# Patient Record
Sex: MALE | Race: WHITE | NOT HISPANIC OR LATINO | URBAN - NONMETROPOLITAN AREA
[De-identification: names, ages, dates, MRNs, and addresses within clinical notes are randomized per-mention and may not be internally consistent; named-entity substitution may affect disease eponyms.]

---

## 2019-03-05 ENCOUNTER — IMPORTED ENCOUNTER (OUTPATIENT)
Dept: URBAN - NONMETROPOLITAN AREA CLINIC 1 | Facility: CLINIC | Age: 59
End: 2019-03-05

## 2019-03-05 PROBLEM — H52.4: Noted: 2019-03-05

## 2019-03-05 PROBLEM — H52.13: Noted: 2019-03-05

## 2019-03-05 PROCEDURE — 92004 COMPRE OPH EXAM NEW PT 1/>: CPT

## 2019-03-05 PROCEDURE — 92015 DETERMINE REFRACTIVE STATE: CPT

## 2019-03-05 NOTE — PATIENT DISCUSSION
Myopia-Discussed diagnosis with patient. -Explained that people who are myopic are at a higher risk for developing RD/RT and reviewed associated S&S.-Pt to contact our office if symptoms develop. Presbyopia-Discussed diagnosis with patient. Updated spec Rx given. Recommend lens that will provide comfort as well as protect safety and health of eyes.

## 2020-03-10 ENCOUNTER — IMPORTED ENCOUNTER (OUTPATIENT)
Dept: URBAN - NONMETROPOLITAN AREA CLINIC 1 | Facility: CLINIC | Age: 60
End: 2020-03-10

## 2020-03-10 PROCEDURE — 92014 COMPRE OPH EXAM EST PT 1/>: CPT

## 2020-03-10 PROCEDURE — 92015 DETERMINE REFRACTIVE STATE: CPT

## 2021-03-16 ENCOUNTER — IMPORTED ENCOUNTER (OUTPATIENT)
Dept: URBAN - NONMETROPOLITAN AREA CLINIC 1 | Facility: CLINIC | Age: 61
End: 2021-03-16

## 2021-03-16 ENCOUNTER — PREPPED CHART (OUTPATIENT)
Dept: RURAL CLINIC 1 | Facility: CLINIC | Age: 61
End: 2021-03-16

## 2021-03-16 PROCEDURE — 92014 COMPRE OPH EXAM EST PT 1/>: CPT

## 2021-03-16 PROCEDURE — 92015 DETERMINE REFRACTIVE STATE: CPT

## 2021-03-16 NOTE — PATIENT DISCUSSION
Patient given Rx for glasses. Recommended lens that will provide comfort, as well as protect safety and health of eyes.

## 2021-03-16 NOTE — PATIENT DISCUSSION
Discussed diagnosis with patient. Explained that people who are myopic are at a higher risk for RD/RT and reviewed associated S/S. Patient is to contact office if symptoms develop. Updated Rx given.

## 2022-03-21 ASSESSMENT — TONOMETRY
OD_IOP_MMHG: 17
OS_IOP_MMHG: 17

## 2022-03-21 ASSESSMENT — VISUAL ACUITY
OD_CC: 20/30
OU_CC: 20/40
OS_CC: 20/30

## 2022-03-22 ENCOUNTER — COMPREHENSIVE EXAM (OUTPATIENT)
Dept: RURAL CLINIC 1 | Facility: CLINIC | Age: 62
End: 2022-03-22

## 2022-03-22 DIAGNOSIS — H52.13: ICD-10-CM

## 2022-03-22 DIAGNOSIS — H52.4: ICD-10-CM

## 2022-03-22 DIAGNOSIS — H25.13: ICD-10-CM

## 2022-03-22 PROCEDURE — 99214 OFFICE O/P EST MOD 30 MIN: CPT

## 2022-03-22 ASSESSMENT — TONOMETRY
OS_IOP_MMHG: 17
OD_IOP_MMHG: 17

## 2022-03-22 ASSESSMENT — VISUAL ACUITY
OD_CC: 20/20
OS_PH: 20/20
OS_CC: 20/30
OU_CC: 20/20

## 2022-04-15 ASSESSMENT — VISUAL ACUITY
OS_SC: 20/30
OD_PH: 20/40
OU_CC: 20/40
OS_SC: 20/40
OD_CC: J1
OS_CC: J1
OD_SC: 20/40
OD_SC: 20/30
OD_CC: J1
OD_SC: 20/50
OS_CC: J1
OS_SC: 20/30

## 2022-04-15 ASSESSMENT — TONOMETRY
OS_IOP_MMHG: 17
OS_IOP_MMHG: 20
OD_IOP_MMHG: 18
OD_IOP_MMHG: 19
OD_IOP_MMHG: 17
OS_IOP_MMHG: 18